# Patient Record
Sex: MALE
[De-identification: names, ages, dates, MRNs, and addresses within clinical notes are randomized per-mention and may not be internally consistent; named-entity substitution may affect disease eponyms.]

---

## 2023-02-16 PROBLEM — Z00.00 ENCOUNTER FOR PREVENTIVE HEALTH EXAMINATION: Status: ACTIVE | Noted: 2023-02-16

## 2023-02-22 ENCOUNTER — APPOINTMENT (OUTPATIENT)
Dept: UROLOGY | Facility: CLINIC | Age: 23
End: 2023-02-22
Payer: COMMERCIAL

## 2023-02-22 VITALS
WEIGHT: 190 LBS | HEIGHT: 75 IN | HEART RATE: 70 BPM | TEMPERATURE: 98.1 F | BODY MASS INDEX: 23.62 KG/M2 | DIASTOLIC BLOOD PRESSURE: 71 MMHG | SYSTOLIC BLOOD PRESSURE: 132 MMHG | OXYGEN SATURATION: 97 %

## 2023-02-22 DIAGNOSIS — Z78.9 OTHER SPECIFIED HEALTH STATUS: ICD-10-CM

## 2023-02-22 DIAGNOSIS — R86.8 OTHER ABNORMAL FINDINGS IN SPECIMENS FROM MALE GENITAL ORGANS: ICD-10-CM

## 2023-02-22 DIAGNOSIS — R30.0 DYSURIA: ICD-10-CM

## 2023-02-22 PROCEDURE — 99203 OFFICE O/P NEW LOW 30 MIN: CPT

## 2023-02-22 NOTE — HISTORY OF PRESENT ILLNESS
[FreeTextEntry1] : 22 yr old male presents with questions with reproductive health after noticing his semen had less volume and was a yellowish color. Denies pain or burning with erections or ejaculation. He denies any LUTS.  He has no hx of STIs, and has tested negative when screening for STIs in the past. \par \par He has not been involved in a pregnancy. He does not have any known chemical exposures or previous radiation. \par

## 2023-02-22 NOTE — PHYSICAL EXAM
[General Appearance - Well Developed] : well developed [General Appearance - Well Nourished] : well nourished [Normal Appearance] : normal appearance [Well Groomed] : well groomed [General Appearance - In No Acute Distress] : no acute distress [Edema] : no peripheral edema [Respiration, Rhythm And Depth] : normal respiratory rhythm and effort [Exaggerated Use Of Accessory Muscles For Inspiration] : no accessory muscle use [Abdomen Soft] : soft [Abdomen Tenderness] : non-tender [Costovertebral Angle Tenderness] : no ~M costovertebral angle tenderness [Urethral Meatus] : meatus normal [Scrotum] : the scrotum was normal [Testes Mass (___cm)] : there were no testicular masses [Normal Station and Gait] : the gait and station were normal for the patient's age [] : no rash [No Focal Deficits] : no focal deficits [Oriented To Time, Place, And Person] : oriented to person, place, and time [Affect] : the affect was normal [Mood] : the mood was normal [Not Anxious] : not anxious [Penis Abnormality] : normal circumcised penis [Epididymis] : the epididymides were normal [Testes Tenderness] : no tenderness of the testes [FreeTextEntry1] : No perineal tenderness

## 2023-02-22 NOTE — ASSESSMENT
[FreeTextEntry1] : 22 yr old male presents with decreased semen volume and discoloration of semen. He is concerned about his reproductive health. We discussed that male infertility is considered after 1 year of inability to achieve pregnancy. We discussed obtaining a semen analysis if he is concerned. Patient would like to proceed. \par \par Plan:\par 1. Semen analysis\par 2. UA, UCx, GC/CL, trichomonas, ureaplasma\par 3. RTC after #1

## 2023-02-28 ENCOUNTER — NON-APPOINTMENT (OUTPATIENT)
Age: 23
End: 2023-02-28

## 2023-02-28 LAB
APPEARANCE: CLEAR
BACTERIA UR CULT: NORMAL
BACTERIA: NEGATIVE
BILIRUBIN URINE: NEGATIVE
BLOOD URINE: NEGATIVE
C TRACH RRNA SPEC QL NAA+PROBE: NOT DETECTED
COLOR: YELLOW
GLUCOSE QUALITATIVE U: NEGATIVE
HYALINE CASTS: 7 /LPF
KETONES URINE: NEGATIVE
LEUKOCYTE ESTERASE URINE: ABNORMAL
MICROSCOPIC-UA: NORMAL
N GONORRHOEA RRNA SPEC QL NAA+PROBE: NOT DETECTED
NITRITE URINE: NEGATIVE
PH URINE: 6
PROTEIN URINE: ABNORMAL
RED BLOOD CELLS URINE: 6 /HPF
SOURCE AMPLIFICATION: NORMAL
SOURCE AMPLIFICATION: NORMAL
SPECIFIC GRAVITY URINE: 1.03
SQUAMOUS EPITHELIAL CELLS: 1 /HPF
T VAGINALIS RRNA SPEC QL NAA+PROBE: NOT DETECTED
UROBILINOGEN URINE: NORMAL
WHITE BLOOD CELLS URINE: 20 /HPF

## 2023-03-01 LAB
MYCOPLASMA HOMINIS CULTURE: POSITIVE
UREAPLASMA CULTURE: POSITIVE

## 2023-03-01 RX ORDER — DOXYCYCLINE HYCLATE 100 MG/1
100 TABLET ORAL TWICE DAILY
Qty: 20 | Refills: 0 | Status: ACTIVE | COMMUNITY
Start: 2023-03-01 | End: 1900-01-01

## 2023-07-28 ENCOUNTER — APPOINTMENT (OUTPATIENT)
Dept: UROLOGY | Facility: CLINIC | Age: 23
End: 2023-07-28
Payer: COMMERCIAL

## 2023-07-28 VITALS
TEMPERATURE: 98 F | HEART RATE: 47 BPM | DIASTOLIC BLOOD PRESSURE: 72 MMHG | SYSTOLIC BLOOD PRESSURE: 119 MMHG | OXYGEN SATURATION: 98 %

## 2023-07-28 DIAGNOSIS — Z22.39 CARRIER OF OTHER SPECIFIED BACTERIAL DISEASES: ICD-10-CM

## 2023-07-28 DIAGNOSIS — R86.8 OTHER ABNORMAL FINDINGS IN SPECIMENS FROM MALE GENITAL ORGANS: ICD-10-CM

## 2023-07-28 LAB
BILIRUB UR QL STRIP: NORMAL
CLARITY UR: CLEAR
COLLECTION METHOD: NORMAL
GLUCOSE UR-MCNC: NORMAL
HCG UR QL: 0.2 EU/DL
HGB UR QL STRIP.AUTO: NORMAL
KETONES UR-MCNC: NORMAL
LEUKOCYTE ESTERASE UR QL STRIP: NORMAL
NITRITE UR QL STRIP: NORMAL
PH UR STRIP: 6
PROT UR STRIP-MCNC: NORMAL
SP GR UR STRIP: 1.02

## 2023-07-28 PROCEDURE — 81003 URINALYSIS AUTO W/O SCOPE: CPT | Mod: QW

## 2023-07-28 PROCEDURE — 99213 OFFICE O/P EST LOW 20 MIN: CPT

## 2023-07-28 NOTE — ASSESSMENT
[FreeTextEntry1] : 23 yo male with discoloration of the ejaculate with hx of mycoplasma and Ureaplasma infection.  We will repeat his urine cx.  he is otherwise asymptomatic at this time. \par \par Plan:\par 1. Ureaplasma/mycoplasma cx\par 2. F/u after # 1

## 2023-07-28 NOTE — HISTORY OF PRESENT ILLNESS
[FreeTextEntry1] : 2/22/23:\par 22 yr old male presents with questions with reproductive health after noticing his semen had less volume and was a yellowish color. Denies pain or burning with erections or ejaculation. He denies any LUTS.  He has no hx of STIs, and has tested negative when screening for STIs in the past. \par \par He has not been involved in a pregnancy. He does not have any known chemical exposures or previous radiation. \par \par ***************\par 7/28/23:\par Patient returns for follow up.  He saw resolution of ejaculate discoloration after his course of abx for mycoplasma and Ureaplasma, but he saw a return of the discoloration after stopping abx.  he denies any voiding symptoms or penile discharge at this time. \par

## 2023-07-28 NOTE — PHYSICAL EXAM
[General Appearance - Well Developed] : well developed [Normal Appearance] : normal appearance [Abdomen Soft] : soft [Abdomen Tenderness] : non-tender [Abdomen Hernia] : no hernia was discovered [Costovertebral Angle Tenderness] : no ~M costovertebral angle tenderness [Urethral Meatus] : meatus normal [Penis Abnormality] : normal circumcised penis [Epididymis] : the epididymides were normal [Testes Tenderness] : no tenderness of the testes [Testes Mass (___cm)] : there were no testicular masses [Skin Color & Pigmentation] : normal skin color and pigmentation [Heart Rate And Rhythm] : Heart rate and rhythm were normal [] : no respiratory distress [Oriented To Time, Place, And Person] : oriented to person, place, and time [Normal Station and Gait] : the gait and station were normal for the patient's age [No Focal Deficits] : no focal deficits

## 2023-08-04 ENCOUNTER — NON-APPOINTMENT (OUTPATIENT)
Age: 23
End: 2023-08-04

## 2023-08-04 LAB
MYCOPLASMA HOMINIS CULTURE: NEGATIVE
UREAPLASMA CULTURE: NEGATIVE